# Patient Record
Sex: MALE | Race: WHITE | NOT HISPANIC OR LATINO | Employment: OTHER | URBAN - METROPOLITAN AREA
[De-identification: names, ages, dates, MRNs, and addresses within clinical notes are randomized per-mention and may not be internally consistent; named-entity substitution may affect disease eponyms.]

---

## 2021-09-25 ENCOUNTER — HOSPITAL ENCOUNTER (EMERGENCY)
Facility: MEDICAL CENTER | Age: 41
End: 2021-09-25
Attending: EMERGENCY MEDICINE | Admitting: EMERGENCY MEDICINE
Payer: COMMERCIAL

## 2021-09-25 ENCOUNTER — HOSPITAL ENCOUNTER (EMERGENCY)
Facility: MEDICAL CENTER | Age: 41
End: 2021-09-25
Payer: COMMERCIAL

## 2021-09-25 VITALS
SYSTOLIC BLOOD PRESSURE: 144 MMHG | HEART RATE: 93 BPM | HEIGHT: 74 IN | DIASTOLIC BLOOD PRESSURE: 81 MMHG | WEIGHT: 172.4 LBS | OXYGEN SATURATION: 96 % | TEMPERATURE: 98.5 F | BODY MASS INDEX: 22.13 KG/M2 | RESPIRATION RATE: 18 BRPM

## 2021-09-25 VITALS
SYSTOLIC BLOOD PRESSURE: 118 MMHG | BODY MASS INDEX: 21.25 KG/M2 | DIASTOLIC BLOOD PRESSURE: 70 MMHG | WEIGHT: 165.57 LBS | HEIGHT: 74 IN | OXYGEN SATURATION: 95 % | RESPIRATION RATE: 18 BRPM | HEART RATE: 71 BPM | TEMPERATURE: 98.4 F

## 2021-09-25 DIAGNOSIS — R05.9 COUGH: ICD-10-CM

## 2021-09-25 DIAGNOSIS — U07.1 COVID-19: ICD-10-CM

## 2021-09-25 PROCEDURE — 700111 HCHG RX REV CODE 636 W/ 250 OVERRIDE (IP): Performed by: EMERGENCY MEDICINE

## 2021-09-25 PROCEDURE — 302449 STATCHG TRIAGE ONLY (STATISTIC)

## 2021-09-25 PROCEDURE — M0243 CASIRIVI AND IMDEVI INFUSION: HCPCS

## 2021-09-25 PROCEDURE — 99284 EMERGENCY DEPT VISIT MOD MDM: CPT

## 2021-09-25 RX ADMIN — CASIRIVIMAB AND IMDEVIMAB 300 MG: 600; 600 INJECTION, SOLUTION, CONCENTRATE INTRAVENOUS at 09:59

## 2021-09-25 NOTE — ED TRIAGE NOTES
"Chief Complaint   Patient presents with   • Cough     pt c/o positive covid test last Thursday with worsening cough, fatigue and fever.        /81   Pulse 96   Temp 36.9 °C (98.5 °F) (Temporal)   Resp 18   Ht 1.88 m (6' 2\")   Wt 75.1 kg (165 lb 9.1 oz)   SpO2 98%   BMI 21.26 kg/m²     "

## 2021-09-25 NOTE — ED NOTES
ERP at bedside. Pt agrees with plan of care discussed by ERP. AIDET acknowledged with patient. Katelynn in low position, side rail up for pt safety. Call light within reach. Will continue to monitor.

## 2021-09-25 NOTE — ED PROVIDER NOTES
"ED Provider Note    Scribed for Gael Hill M.D. by Brenna Manuel. 9/25/2021  9:11 AM    Primary care provider: No primary care provider noted  Means of arrival: Walk in  History obtained from: Patient  History limited by: None    CHIEF COMPLAINT  Chief Complaint   Patient presents with    Cough     pt c/o positive covid test last Thursday with worsening cough, fatigue and fever.        HPI  Hong Eugene is a 40 y.o. male who presents to the Emergency Department for evaluation of worsening cough onset two days ago. Patient tested positive for COVID-19 ten days ago.  He admits to associated symptoms of body aches, fatigue, and fever but denies diarrhea or vomiting. No alleviating factors were reported. Patient is fully vaccinated for COVID-19.  Patient does have a history of asthma.  He has not required his inhalers.    REVIEW OF SYSTEMS  Pertinent positives include cough, fever, fatigue, and body aches.   Pertinent negatives include no vomiting or diarrhea.    All other systems reviewed and negative. See HPI for further details.       PAST MEDICAL HISTORY       SURGICAL HISTORY  patient denies any surgical history    SOCIAL HISTORY  Social History     Tobacco Use    Smoking status: None   Substance Use Topics    Alcohol use: Not noted    Drug use: Not noted      Social History     Substance and Sexual Activity   Drug Use Not noted       FAMILY HISTORY  No family history noted    CURRENT MEDICATIONS  Home Medications    **Home medications have not yet been reviewed for this encounter**         ALLERGIES  No Known Allergies    PHYSICAL EXAM  VITAL SIGNS: /81   Pulse 96   Temp 36.9 °C (98.5 °F) (Temporal)   Resp 18   Ht 1.88 m (6' 2\")   Wt 75.1 kg (165 lb 9.1 oz)   SpO2 98%   BMI 21.26 kg/m²     Nursing note and vitals reviewed.  Constitutional: Well-developed and well-nourished. No distress.   HENT: Head is normocephalic and atraumatic. Oropharynx is clear and moist without exudate or " erythema.   Eyes: Pupils are equal, round, and reactive to light. Conjunctiva are normal.   Cardiovascular: Normal rate and regular rhythm. No murmur heard. Normal radial pulses.  Pulmonary/Chest: Occasional dry cough, Scattered rhonchi, rales, normal work of breathing.  No wheezing.  Abdominal: Soft and non-tender. No distention    Musculoskeletal: Extremities exhibit normal range of motion without edema or tenderness.   Neurological: Awake, alert and oriented to person, place, and time. No focal deficits noted.  Skin: Skin is warm and dry. No rash.   Psychiatric: Normal mood and affect. Appropriate for clinical situation.    COURSE & MEDICAL DECISION MAKING  Nursing notes, VS, PMSFHx reviewed in chart.     9:11 AM - Patient seen and examined at bedside where he informed me of his history of COVID-19. Patient was diagnosed with COVID-19 ten days ago and has had a worsening cough since two days ago. Patient will be treated with Regen- mg injection. The differential diagnoses include but are not limited to: COVID-19. I discussed plan for discharge and follow up as outlined below. The patient verbalizes they feel comfortable going home. The patient is stable for discharge at this time and will return for any new or worsening symptoms. Patient verbalizes understanding and support with my plan for discharge.     Monoclonal antibody infusion EUA progress note    This patient is considered a candidate for monoclonal antibody infusion to treat/prevent high risk SARS-CoV-2 virus infection.      Criteria for use (treatment):  -Mild to moderate illness for less than 10 days and is high risk for progressing to severe COVID-19 and/or hospitalization.    -Not hospitalized.   -Does not require oxygen therapy due to COVID-19.    ->40 kg and 12 years old or greater    Criteria for use (post-exposure prophylaxis):  -High risk for progressing to severe COVID-19 and/or hospitalization  -Exposed (close contact per CDC) to  COVID-19 positive individual OR high risk of exposure to COVID-19 because of positive individual in institutional setting   -Not fully vaccinated or not expected to mount an adequate immune response due to immunocompromising condition or immunosuppressive medication  ->40 kg and 12 years old or greater    Date of positive COVID-19 test (treatment) or known exposure (post-exposure prophylaxis): 9/16/21    Vaccinated for COVID-19 (yes/no): Yes     Symptoms of COVID-19 (if being used for treatment): cough, fever, fatigue, body aches    High risk criteria:   -Age of 65 or greater  -Body mass index of 25 kg/m2or greater  -12-17 years of age and have  -BMI greater than or equal to 85% of their age and gender based CDC growth chart  -Chronic kidney disease  -Diabetes  -Pregnancy  -Immunosuppressive disease  -Receiving immunosuppressive treatment  -Chronic lung disease  -65 years of age or greater  -Cardiovascular disease  -Hypertension  -Medical related technological dependence  -Sickle cell disease  -Congenital or acquired heart disease  -Neurodevelopmental disorder  -Other medical conditions or factors that place individual patients at high risk for progression to severe COVID-19 and authorization of REGEN-COV under the EUA is not limited to the medical conditions or factors listed above. Healthcare providers should consider the benefit-risk for an individual patient.    This patient has been given the EUA patient fact sheet and consents to receiving this medication.       The patient will return for new or worsening symptoms and is stable at the time of discharge.    The patient is referred to a primary physician for blood pressure management, diabetic screening, and for all other preventative health concerns.    DISPOSITION:  Patient will be discharged home in stable condition.    FOLLOW UP:  [facility], Emergency Dept  [address]  [place] [id number] [phone]          FINAL  IMPRESSION  1. Cough    2. COVID-19          I, Brenna Manuel (Scribe), am scribing for, and in the presence of, Gael Hill M.D..    Electronically signed by: Brenna Manuel (Scribe), 9/25/2021    Gael WALKER M.D. personally performed the services described in this documentation, as scribed by Brenna Manuel in my presence, and it is both accurate and complete. C    The note accurately reflects work and decisions made by me.  Gael Hill M.D.  9/25/2021  12:46 PM

## 2022-12-07 ENCOUNTER — OFFICE VISIT (OUTPATIENT)
Dept: SURGERY | Age: 42
End: 2022-12-07
Payer: COMMERCIAL

## 2022-12-07 VITALS
WEIGHT: 244 LBS | OXYGEN SATURATION: 98 % | RESPIRATION RATE: 16 BRPM | BODY MASS INDEX: 31.32 KG/M2 | SYSTOLIC BLOOD PRESSURE: 124 MMHG | HEIGHT: 74 IN | HEART RATE: 100 BPM | TEMPERATURE: 98.1 F | DIASTOLIC BLOOD PRESSURE: 86 MMHG

## 2022-12-07 DIAGNOSIS — K21.9 GASTROESOPHAGEAL REFLUX DISEASE, UNSPECIFIED WHETHER ESOPHAGITIS PRESENT: Primary | ICD-10-CM

## 2022-12-07 PROCEDURE — 99204 OFFICE O/P NEW MOD 45 MIN: CPT | Performed by: SURGERY

## 2022-12-07 RX ORDER — METFORMIN HYDROCHLORIDE 500 MG/1
1000 TABLET ORAL 2 TIMES DAILY WITH MEALS
COMMUNITY

## 2022-12-07 RX ORDER — GLIPIZIDE 10 MG/1
10 TABLET ORAL 2 TIMES DAILY
COMMUNITY

## 2022-12-07 NOTE — PROGRESS NOTES
General Surgery Office Consultation / H & P    CC: GERD  History of Present Illness:      Abby Irby is a 38 y.o. male who presents with GERD. Patient reports over the last few years he has had worsening pain in his epigastrium. He has burning up into his chest.  This is worse with citrus food, tomato sauce, pizza, and spicy food. He is able to tolerate bland food such as bread and sugary things and salads. He reports vomiting a few times a week. The pain is severe and burning at about a 7 or so out of 10. No medication helps. He is leery of taking long-term antacids with fear of kidney issues. History of a prior laparoscopic appendectomy. No known hiatal hernia. No previous work-up for GERD to date. Past Medical History:   Diagnosis Date    Diabetes ([facility] [address]      History reviewed. No pertinent surgical history. History reviewed. No pertinent family history. Social History     Socioeconomic History    Marital status: SINGLE   Tobacco Use    Smoking status: Every Day     Packs/day: 0.50     Types: Cigarettes    Smokeless tobacco: Never   Vaping Use    Vaping Use: Never used   Substance and Sexual Activity    Alcohol use: Yes    Drug use: Never      Prior to Admission medications    Medication Sig Start Date End Date Taking? Authorizing Provider   metFORMIN (GLUCOPHAGE) 500 mg tablet Take 1,000 mg by mouth two (2) times daily (with meals). Yes Provider, Historical   glipiZIDE (GLUCOTROL) 10 mg tablet Take 10 mg by mouth two (2) times a day.    Yes Provider, Historical     No Known Allergies    Review of Systems:  Constitutional: No fever or chills  Neurologic: No headache  Eyes: No scleral icterus or irritated eyes  Nose: No nasal pain or drainage  Mouth: No oral lesions or sore throat  Cardiac: No palpations or chest pain  Pulmonary: No cough or shortness of breath  Gastrointestinal: GERD, nausea, emesis, no diarrhea, or constipation  Genitourinary: No dysuria  Musculoskeletal: No muscle or joint tenderness  Skin: No rashes or lesions  Psychiatric: No anxiety or depressed mood    Physical Exam:   Visit Vitals  /86 (BP 1 Location: Right arm, BP Patient Position: Sitting, BP Cuff Size: Large adult)   Pulse 100   Temp 98.1 °F (36.7 °C) (Oral)   Resp 16   Ht 6' 2\" (1.88 m)   Wt 244 lb (110.7 kg)   SpO2 98%   BMI 31.33 kg/m²     General: No acute distress, conversant  Eyes: PERRLA, no scleral icterus  HENT: Normocephalic without oral lesions  Neck: Trachea midline without LAD  Cardiac: Normal pulse rate and rhythm  Pulmonary: Symmetric chest rise with normal effort  GI: Soft, NT, ND, no hernia, no splenomegaly  Skin: Warm without rash  Extremities: No edema or joint stiffness  Psych: Appropriate mood and affect    Assessment:     73-year-old male with what appears to be significant GERD symptoms    Plan:   Appears to have significant GERD symptoms that did not respond to over-the-counter medications prior. Recommend EGD with Bravo clip to evaluate for hiatal hernia and acid monitoring. Also biopsy at that time. Discussed the risk and benefits of the procedure. We will meet back after this to go over results and discuss any needed future surgical intervention.     Signed By: Yadira Bueno MD  Bariatric and General Surgeon  [facility]    December 7, 2022

## 2022-12-07 NOTE — PROGRESS NOTES
Identified pt with two pt identifiers (name and ). Reviewed chart in preparation for visit and have obtained necessary documentation. Dominic Wolfe is a 38 y.o. male  Chief Complaint   Patient presents with    Epigastric Pain    Heartburn            Visit Vitals  /86 (BP 1 Location: Right arm, BP Patient Position: Sitting, BP Cuff Size: Large adult)   Pulse 100   Temp 98.1 °F (36.7 °C) (Oral)   Resp 16   Ht 6' 2\" (1.88 m)   Wt 244 lb (110.7 kg)   SpO2 98%   BMI 31.33 kg/m²       1. Have you been to the ER, urgent care clinic since your last visit? Hospitalized since your last visit? No    2. Have you seen or consulted any other health care providers outside of the [address] [place] [address] since your last visit? Include any pap smears or colon screening.  No

## 2022-12-19 ENCOUNTER — TELEPHONE (OUTPATIENT)
Dept: SURGERY | Age: 42
End: 2022-12-19

## 2022-12-19 NOTE — TELEPHONE ENCOUNTER
Returning patients call to schedule EGD. Spoke to patient to schedule 2/27/23 EGD. I let him know that this procedure is done at [address] [place] [address]. He acknowledged he  was fine with that. I offered 2/27/23  @ 7:30AM with arrival time of 6:00AM.  He acknowledged: he would take it     Message with to Bulmaro Van due to patient is requesting something to help with his acid reflux    I told him once this is scheduled I will put all this information we discussed in a letter that will go out in the mail.   He acknowledged thank you for your help

## 2022-12-20 ENCOUNTER — TELEPHONE (OUTPATIENT)
Dept: SURGERY | Age: 42
End: 2022-12-20

## 2022-12-20 NOTE — TELEPHONE ENCOUNTER
I called and spoke with the patient. I informed him the doctor will send a script for Protonix to his pharmacy. 7 days prior to the procedure you are to stop the Protonix and you can only take Tums or Mylanta. He recommended you take 1 to 2 days off for the procedure. The patient acknowledged understanding and thanked me for the call.

## 2022-12-20 NOTE — TELEPHONE ENCOUNTER
----- Message from Manda Howell MD sent at 12/20/2022  4:21 PM EST -----  Regarding: RE: EGD question  Sure, I will order him Protonix to take until a week prior to his endoscopy. Then the same rules apply and he should take only over-the-counter Tums or Mylanta for acid reflux. Please insert his pharmacy into the computers are consented to his pharmacy. Yes take the next 1 to 2 days off of work to complete the Bravo testing. Thanks      ----- Message -----  From: Alec Man LPN  Sent: 96/63/4157   1:22 PM EST  To: Manda Howell MD  Subject: Jean-Claude Calabrese: EGD question                                   ----- Message -----  From: Sarah Fuller  Sent: 12/19/2022   1:43 PM EST  To: Ezell Merlin, LPN  Subject: EGD question                                     Patient is scheduled for EGD first opening due to Lorenza Pelayo. He is wanting to know if he needs to take the next day of of work? Also wanting if there is something that can be prescribed for acid reflux. He states he had a bad episode today.   Pt# [phone]

## 2023-01-04 ENCOUNTER — DOCUMENTATION ONLY (OUTPATIENT)
Dept: SURGERY | Age: 43
End: 2023-01-04

## 2023-01-04 NOTE — PROGRESS NOTES
Checked to see if prior Filomena Certain was needed for [facility] and [phone]. Checked through [employer] for WOMN. Carin said no prior auth for [address] but referred to [facility] for [address]. When I submited through [employer] it stated it does not meet medical necessity. So Dr Anastasiya Ronquillo called to talk to a provider and was informed it didn't need prior auth. He didn't get the name of who he spoke to.

## 2023-02-13 ENCOUNTER — TELEPHONE (OUTPATIENT)
Dept: SURGERY | Age: 43
End: 2023-02-13

## 2023-02-13 NOTE — TELEPHONE ENCOUNTER
Called patient in attempt to reschedule upcoming appointment on 3/13 with Dr. Jossie Bush due to provider being at [facility]. No answer, unable to leave voicemail. Voicemail box not set up.

## 2023-02-24 ENCOUNTER — TELEPHONE (OUTPATIENT)
Dept: SURGERY | Age: 43
End: 2023-02-24

## 2023-02-24 ENCOUNTER — ANESTHESIA EVENT (OUTPATIENT)
Dept: ENDOSCOPY | Age: 43
End: 2023-02-24
Payer: COMMERCIAL

## 2023-02-24 NOTE — TELEPHONE ENCOUNTER
I called and spoke with the patient. I reminded him of his EGD scheduled for Monday. I informed him you will need to arrive at 6:00 am. Nothing to eat or drink after midnight. You can take your medications in the am with a few sips of water. If you are a diabetic you will want to hold your medication because your not able to eat anything. You will need to have a drive and bring a photo id plus proof of insurance. When you enter the main entrance of [facility] you catch the elevator to the second floor to outpatient registration. He acknowledged understanding and thanked me for the call.

## 2023-02-27 ENCOUNTER — HOSPITAL ENCOUNTER (EMERGENCY)
Age: 43
Discharge: HOME | End: 2023-02-27
Attending: EMERGENCY MEDICINE
Payer: COMMERCIAL

## 2023-02-27 ENCOUNTER — HOSPITAL ENCOUNTER (OUTPATIENT)
Age: 43
Setting detail: OUTPATIENT SURGERY
Discharge: HOME | End: 2023-02-27
Attending: SURGERY | Admitting: SURGERY
Payer: COMMERCIAL

## 2023-02-27 ENCOUNTER — ANESTHESIA (OUTPATIENT)
Dept: ENDOSCOPY | Age: 43
End: 2023-02-27
Payer: COMMERCIAL

## 2023-02-27 VITALS
RESPIRATION RATE: 16 BRPM | SYSTOLIC BLOOD PRESSURE: 143 MMHG | BODY MASS INDEX: 31.06 KG/M2 | OXYGEN SATURATION: 100 % | DIASTOLIC BLOOD PRESSURE: 102 MMHG | TEMPERATURE: 97.5 F | WEIGHT: 234.35 LBS | HEIGHT: 73 IN | HEART RATE: 94 BPM

## 2023-02-27 VITALS
HEIGHT: 73 IN | DIASTOLIC BLOOD PRESSURE: 77 MMHG | OXYGEN SATURATION: 97 % | SYSTOLIC BLOOD PRESSURE: 123 MMHG | TEMPERATURE: 98.7 F | HEART RATE: 93 BPM | BODY MASS INDEX: 30.68 KG/M2 | WEIGHT: 231.48 LBS | RESPIRATION RATE: 14 BRPM

## 2023-02-27 DIAGNOSIS — R73.9 ELEVATED BLOOD SUGAR: ICD-10-CM

## 2023-02-27 DIAGNOSIS — Z13.9 ENCOUNTER FOR MEDICAL SCREENING EXAMINATION: Primary | ICD-10-CM

## 2023-02-27 DIAGNOSIS — Z76.0 MEDICATION REFILL: ICD-10-CM

## 2023-02-27 LAB
GLUCOSE BLD STRIP.AUTO-MCNC: 296 MG/DL (ref 65–117)
GLUCOSE BLD STRIP.AUTO-MCNC: 338 MG/DL (ref 65–117)
GLUCOSE BLD STRIP.AUTO-MCNC: 372 MG/DL (ref 65–117)
GLUCOSE BLD STRIP.AUTO-MCNC: 433 MG/DL (ref 65–117)
GLUCOSE BLD STRIP.AUTO-MCNC: 437 MG/DL (ref 65–117)
SERVICE CMNT-IMP: ABNORMAL

## 2023-02-27 PROCEDURE — 2709999900 HC NON-CHARGEABLE SUPPLY: Performed by: SURGERY

## 2023-02-27 PROCEDURE — 82962 GLUCOSE BLOOD TEST: CPT

## 2023-02-27 PROCEDURE — 76040000019: Performed by: SURGERY

## 2023-02-27 PROCEDURE — 74011636637 HC RX REV CODE- 636/637: Performed by: SURGERY

## 2023-02-27 PROCEDURE — 74011250636 HC RX REV CODE- 250/636: Performed by: SURGERY

## 2023-02-27 PROCEDURE — 76060000031 HC ANESTHESIA FIRST 0.5 HR: Performed by: SURGERY

## 2023-02-27 PROCEDURE — 88305 TISSUE EXAM BY PATHOLOGIST: CPT

## 2023-02-27 PROCEDURE — 43239 EGD BIOPSY SINGLE/MULTIPLE: CPT | Performed by: SURGERY

## 2023-02-27 PROCEDURE — 99283 EMERGENCY DEPT VISIT LOW MDM: CPT

## 2023-02-27 PROCEDURE — 77030021593 HC FCPS BIOP ENDOSC BSC -A: Performed by: SURGERY

## 2023-02-27 RX ORDER — IBUPROFEN 200 MG
4 TABLET ORAL AS NEEDED
Status: DISCONTINUED | OUTPATIENT
Start: 2023-02-27 | End: 2023-02-27 | Stop reason: HOSPADM

## 2023-02-27 RX ORDER — SODIUM CHLORIDE 0.9 % (FLUSH) 0.9 %
5-40 SYRINGE (ML) INJECTION EVERY 8 HOURS
Status: DISCONTINUED | OUTPATIENT
Start: 2023-02-27 | End: 2023-02-27 | Stop reason: HOSPADM

## 2023-02-27 RX ORDER — SODIUM CHLORIDE 0.9 % (FLUSH) 0.9 %
5-40 SYRINGE (ML) INJECTION AS NEEDED
Status: DISCONTINUED | OUTPATIENT
Start: 2023-02-27 | End: 2023-02-27 | Stop reason: HOSPADM

## 2023-02-27 RX ORDER — DEXTROSE MONOHYDRATE 100 MG/ML
0-250 INJECTION, SOLUTION INTRAVENOUS AS NEEDED
Status: DISCONTINUED | OUTPATIENT
Start: 2023-02-27 | End: 2023-02-27 | Stop reason: HOSPADM

## 2023-02-27 RX ORDER — OMEPRAZOLE 40 MG/1
40 CAPSULE, DELAYED RELEASE ORAL DAILY
Qty: 90 CAPSULE | Refills: 3 | Status: SHIPPED | OUTPATIENT
Start: 2023-02-27 | End: 2023-05-28

## 2023-02-27 RX ORDER — LIDOCAINE HYDROCHLORIDE 20 MG/ML
INJECTION, SOLUTION EPIDURAL; INFILTRATION; INTRACAUDAL; PERINEURAL AS NEEDED
Status: DISCONTINUED | OUTPATIENT
Start: 2023-02-27 | End: 2023-02-27 | Stop reason: HOSPADM

## 2023-02-27 RX ORDER — SODIUM CHLORIDE 9 MG/ML
150 INJECTION, SOLUTION INTRAVENOUS ONCE
Status: COMPLETED | OUTPATIENT
Start: 2023-02-27 | End: 2023-02-27

## 2023-02-27 RX ORDER — METFORMIN HYDROCHLORIDE 500 MG/1
1000 TABLET ORAL 2 TIMES DAILY WITH MEALS
Qty: 120 TABLET | Refills: 0 | Status: SHIPPED | OUTPATIENT
Start: 2023-02-27 | End: 2023-03-29

## 2023-02-27 RX ORDER — SEMAGLUTIDE 1.34 MG/ML
0.5 INJECTION, SOLUTION SUBCUTANEOUS
COMMUNITY
End: 2023-02-27 | Stop reason: SDUPTHER

## 2023-02-27 RX ORDER — SEMAGLUTIDE 1.34 MG/ML
0.5 INJECTION, SOLUTION SUBCUTANEOUS
Qty: 1 BOX | Refills: 0 | Status: SHIPPED | OUTPATIENT
Start: 2023-02-27

## 2023-02-27 RX ORDER — PROPOFOL 10 MG/ML
INJECTION, EMULSION INTRAVENOUS
Status: DISCONTINUED | OUTPATIENT
Start: 2023-02-27 | End: 2023-02-27 | Stop reason: HOSPADM

## 2023-02-27 RX ORDER — FENTANYL CITRATE 50 UG/ML
INJECTION, SOLUTION INTRAMUSCULAR; INTRAVENOUS AS NEEDED
Status: DISCONTINUED | OUTPATIENT
Start: 2023-02-27 | End: 2023-02-27 | Stop reason: HOSPADM

## 2023-02-27 RX ORDER — PROPOFOL 10 MG/ML
INJECTION, EMULSION INTRAVENOUS AS NEEDED
Status: DISCONTINUED | OUTPATIENT
Start: 2023-02-27 | End: 2023-02-27 | Stop reason: HOSPADM

## 2023-02-27 RX ORDER — INSULIN LISPRO 100 [IU]/ML
INJECTION, SOLUTION INTRAVENOUS; SUBCUTANEOUS
Status: DISCONTINUED | OUTPATIENT
Start: 2023-02-27 | End: 2023-02-27 | Stop reason: HOSPADM

## 2023-02-27 RX ORDER — SODIUM CHLORIDE 9 MG/ML
INJECTION, SOLUTION INTRAVENOUS
Status: DISCONTINUED | OUTPATIENT
Start: 2023-02-27 | End: 2023-02-27 | Stop reason: HOSPADM

## 2023-02-27 RX ORDER — INSULIN LISPRO 100 [IU]/ML
10 INJECTION, SOLUTION INTRAVENOUS; SUBCUTANEOUS ONCE
Status: COMPLETED | OUTPATIENT
Start: 2023-02-27 | End: 2023-02-27

## 2023-02-27 RX ADMIN — FENTANYL CITRATE 50 MCG: 50 INJECTION, SOLUTION INTRAMUSCULAR; INTRAVENOUS at 07:57

## 2023-02-27 RX ADMIN — LIDOCAINE HYDROCHLORIDE 100 MG: 20 INJECTION, SOLUTION EPIDURAL; INFILTRATION; INTRACAUDAL; PERINEURAL at 08:03

## 2023-02-27 RX ADMIN — SODIUM CHLORIDE: 9 INJECTION, SOLUTION INTRAVENOUS at 07:50

## 2023-02-27 RX ADMIN — Medication 10 UNITS: at 07:30

## 2023-02-27 RX ADMIN — PROPOFOL 100 MG: 10 INJECTION, EMULSION INTRAVENOUS at 08:03

## 2023-02-27 RX ADMIN — PROPOFOL 150 MCG/KG/MIN: 10 INJECTION, EMULSION INTRAVENOUS at 08:05

## 2023-02-27 RX ADMIN — SODIUM CHLORIDE 150 ML/HR: 9 INJECTION, SOLUTION INTRAVENOUS at 08:33

## 2023-02-27 NOTE — PROGRESS NOTES
Jefe Thomas  1980  [id number]    Situation:  Verbal report received from: Bhavani Avalos Rn  Procedure: Procedure(s):  ESOPHAGOGASTRODUODENOSCOPY (EGD)  ESOPHAGOGASTRODUODENAL (EGD) BIOPSY    Background:    Preoperative diagnosis: GERD  Postoperative diagnosis:  esophagitis    :  Dr. Katarina Gallo  Assistant(s): Endoscopy Technician-1: Jayna Phillips  Endoscopy RN-1: Bryant [place] RN    Specimens:   ID Type Source Tests Collected by Time Destination   1 : gastric antrum biopsy Preservative Gastric  Eunice Green MD 2/27/2023 1925 Pathology   2 : GE junction biopsy Preservative   Eunice Green MD 2/27/2023 0518 Pathology     H. Pylori  no    Assessment:  Intra-procedure medications   Anesthesia gave intra-procedure sedation and medications, see anesthesia flow sheet yes    Intravenous fluids: NS@ KVO     Vital signs stable yes    Abdominal assessment: round and soft yes    Recommendation:  Discharge patient per MD order yes.   Family or Friend girlfriend- Romy Carlos to share finding with family or friend yes

## 2023-02-27 NOTE — ANESTHESIA PREPROCEDURE EVALUATION
Relevant Problems   No relevant active problems       Anesthetic History   No history of anesthetic complications            Review of Systems / Medical History  Patient summary reviewed and pertinent labs reviewed    Pulmonary          Smoker         Neuro/Psych   Within defined limits           Cardiovascular                  Exercise tolerance: >4 METS     GI/Hepatic/Renal     GERD: poorly controlled           Endo/Other    Diabetes: type 2         Other Findings              Physical Exam    Airway  Mallampati: III  TM Distance: 4 - 6 cm         Cardiovascular  Regular rate and rhythm,  S1 and S2 normal,  no murmur, click, rub, or gallop             Dental    Dentition: Implants     Pulmonary  Breath sounds clear to auscultation               Abdominal         Other Findings            Anesthetic Plan    ASA: 3  Anesthesia type: MAC          Induction: Intravenous  Anesthetic plan and risks discussed with: Patient

## 2023-02-27 NOTE — DISCHARGE INSTRUCTIONS
Discharge Instructions for Endoscopy Patients     Take PPI daily. Go to ER later today to get glucose regimen and get PCP asap to help manage this. Do not drive or operate machinery while taking sedating or narcotic medications. Some discomfort is expected in your throat or upper abdomen. Take tylenol as needed. If an acid monitoring device was deployed, please follow the instructions for recording and returning the device. You may walk as desired and go up and down stairs as needed. Walking is encouraged. You may shower like normal    You may resume regular diet. Follow up with provider as scheduled. If you experience fever (greater than 101.5), chills, vomiting or redness or drainage at surgical site, please contact your surgeons office. If you have further questions or concerns, please call your surgeons office at [phone].

## 2023-02-27 NOTE — PERIOP NOTES
5339  Timeout performed. Anesthesia staff at patient's bedside administering anesthesia and monitoring patients vital signs throughout procedure. See anesthesia note. Post procedure, report received from Iris TERRY  Endoscope was pre-cleaned at bedside immediately following procedure by endo tech,    [address]  Patient tolerated procedure. Abdomen soft and patient arousable and voices no complaints. Patient transported to endoscopy recovery area.    Report given to post procedure RNModesto

## 2023-02-27 NOTE — DISCHARGE INSTRUCTIONS
Local Primary Care Physicians  [facility] [phone]  MD Dale Abarca MD Madaline Manna, MD [facility] [phone]  Hemant Yoon, [facility]  MD Jake Burns MD Ned Quin, MD [address]  [phone]  Carl Maxwell, MD Lex Bangura MD [address] [facility] [phone]  MD Elsy Colorado, MD Seema Nur, MD Brandyn Daniels MD   [facility] [phone]  VNRQ CHRISTINA GENAO, MD Corinne Child, MD Mark Carroll, NP [address] [facility] [address] [phone]  MD Sania Gong, MD Yue Joy, MD Seun Astudillo, MD Katie Kam, MD Shona Coto MD   [address] [facility] MD [facility] [phone]  Sulaiman Sanchez, MD Marquita Baker, NP  Aly Seo, MD Maxim Fajardo, MD Gelene Aase, MD Martha Blancas, MD Gerald Domínguez MD   [address] [facility] [phone]  MD Mirna Dee, [facility]  Wolf Sarabia, NP  Germain Swan, MD Travis Kauffman, MD Amrik Coronel MD [facility] [phone]  MD Wilber Mantilla, MD Armand Noland, MD Shantanu Adams MD   [address] [phone]  Tobias Keith, MD Shilpa Esparza [phone]  MD Dee Lugo MD Kathern Rosette, MD   [facility] [phone]  MD Steve Fields, MD Yudith Brown, MD Mortimer Gammons, MD Marvis Rundle, NP  Debbie Corrigan MD [address]   [phone]  MD Van Escobar, MD Rey Boland MD   [address] [facility] [phone]  Arelis Gonzalez MD  Clio Section, P  Abdiel Reynolds, ALEENA Reynolds, [facility]  Sha Hemphill, MD Samina Timmons NP Fleeta Groom,              Miscellaneous:  Denise Wiggins MD [facility] Departments     For adult and child immunizations, family planning, TB screening, STD testing and women's health services. [facility]: Ariel [phone]      [place] [address]   [address]   [address]   170 [facility]: Catalina Noriega 200 [facility] [phone]      [address] [facility]          [address] [address]     For primary care services, woman and child wellness, and some clinics providing specialty care. [facility] -- [address] 2525 [facility] [phone]   [facility] 200 [facility] 3614 [facility] [phone]   [address] [facility] [address] [phone] [address] [facility] [address] [place] [address] 5850  Community  [phone]   [address] [place] [address] [address] [phone]   [facility] 81 [facility] [phone]   [address] [phone]   [facility]: [facility] [address], [address] [facility] [address] [phone]     [address] [phone]   [facility] 5850  Community  [phone]   [employer]  [address] ([email or web address]) [phone]         Sexual Health/Woman Wellness Clinics    For STD/HIV testing and treatment, pregnancy testing and services, men's health, birth control services, LGBT services, and hepatitis/HPV vaccine services. [facility] [facility] [place] for [place] [employer] [address] [facility] 75 [facility] 1579 600 EDayami Mello [phone]   [address] [place] [address] Rd, 5th floor [phone]   [facility] 59 [facility] 118 NDayami Vance [phone]         [address] [facility] [address] [place] [address]   [phone]   [place]   [phone]   Women, Infant and Children's Services: [address] [phone]       [address] [place] [address]   [phone]   [address]   0762 [facility]     [phone]   [address] Crisis   1212 [facility] [phone]

## 2023-02-27 NOTE — PROGRESS NOTES
Endoscopy discharge instructions have been reviewed and given to patient. The patient verbalized understanding and acceptance of instructions. Dr. Livan Tai discussed with  procedure findings and next steps.

## 2023-02-27 NOTE — ED PROVIDER NOTES
[facility] EMERGENCY DEPT  EMERGENCY DEPARTMENT ENCOUNTER       Pt Name: Toño Pritchett  MRN: [id number]  [place] 1980  Date of evaluation: 2/27/2023  Provider: Gene Shah MD   PCP: None  Note Started: 10:23 AM 2/27/23     CHIEF COMPLAINT       Chief Complaint   Patient presents with    Abnormal Lab Results     Pt had an endoscopy this morning. They took his BGL and noted it to be 477. He was given 10 units of insulin at that time. Post surgery he was instructed to come to the ER to make sure his sugars were back to normal        HISTORY OF PRESENT ILLNESS: 1 or more elements      History From: patient, History limited by: none     Toño Pritchett is a 38 y.o. male presents as a referral from outpatient endoscopy. 38 YOM with a history of diabetes and GERD presents to the ED as a referral from outpatient endoscopy at [facility] with abnormal labs. Reports has been n.p.o. since last evening. Has not taken his metformin. He is out of his Ozempic. Prior to the procedure his glucose was 477. He does report some polydipsia but no other symptoms. Denies chest pain, fevers or polyuria. Reports that was given fluids and insulin there. Blood glucose improving but advised to follow-up in the ED due to elevated blood sugars. Patient evaluated triage, no complaints currently. Blood glucose improving here. Please See MDM for Additional Details of the HPI/PMH  Nursing Notes were all reviewed and agreed with or any disagreements were addressed in the HPI. REVIEW OF SYSTEMS        Positives and Pertinent negatives as per HPI.     PAST HISTORY     Past Medical History:  Past Medical History:   Diagnosis Date    Diabetes ([address]     GERD (gastroesophageal reflux disease)        Past Surgical History:  Past Surgical History:   Procedure Laterality Date    HX APPENDECTOMY      HX CHOLECYSTECTOMY      HX HEENT      wisdom teeth extracted - x 3    HX OTHER SURGICAL      had a surgery age 1 yrs - does not recall what it was Family History:  Family History   Problem Relation Age of Onset    Diabetes Father     Diabetes Other         multiple paternal relatives       Social History:  Social History     Tobacco Use    Smoking status: Every Day     Packs/day: 0.50     Types: Cigarettes    Smokeless tobacco: Never   Vaping Use    Vaping Use: Never used   Substance Use Topics    Alcohol use: Not Currently    Drug use: Never       Allergies:  No Known Allergies    CURRENT MEDICATIONS      Discharge Medication List as of 2/27/2023 10:41 AM        CONTINUE these medications which have NOT CHANGED    Details   omeprazole (PRILOSEC) 40 mg capsule Take 1 Capsule by mouth daily for 90 days. , Normal, Disp-90 Capsule, R-3             SCREENINGS               No data recorded         PHYSICAL EXAM      ED Triage Vitals [02/27/23 1003]   ED Encounter Vitals Group      BP (!) 143/102      Pulse (Heart Rate) 94      Resp Rate 16      Temp 97.5 °F (36.4 °C)      Temp src       O2 Sat (%) 100 %      Weight 234 lb 5.6 oz      Height 6' 1\"        Physical Exam  Vitals and nursing note reviewed. Constitutional:       Comments: 38 YOM, sitting in chair, no distress   HENT:      Head: Normocephalic. Pulmonary:      Effort: Pulmonary effort is normal.   Abdominal:      General: Abdomen is flat. Musculoskeletal:         General: Normal range of motion. Skin:     General: Skin is warm. Neurological:      General: No focal deficit present. Mental Status: He is alert.    Psychiatric:         Mood and Affect: Mood normal.        DIAGNOSTIC RESULTS   LABS:     Recent Results (from the past 12 hour(s))   GLUCOSE, POC    Collection Time: 02/27/23  6:52 AM   Result Value Ref Range    Glucose (POC) 433 (H) 65 - 117 mg/dL    Performed by [employer], POC    Collection Time: 02/27/23  6:55 AM   Result Value Ref Range    Glucose (POC) 437 (H) 65 - 117 mg/dL    Performed by [employer], POC    Collection Time: 02/27/23  8:24 AM Result Value Ref Range    Glucose (POC) 372 (H) 65 - 117 mg/dL    Performed by Gaby Posey, POC    Collection Time: 02/27/23  8:53 AM   Result Value Ref Range    Glucose (POC) 338 (H) 65 - 117 mg/dL    Performed by Gaby Posey, POC    Collection Time: 02/27/23 10:09 AM   Result Value Ref Range    Glucose (POC) 296 (H) 65 - 117 mg/dL    Performed by [employer]         EKG: If performed, independent interpretation documented below in the MDM section     RADIOLOGY:  Non-plain film images such as CT, Ultrasound and MRI are read by the radiologist. Plain radiographic images are visualized and preliminarily interpreted by the ED Provider with the findings documented in the MDM section. Interpretation per the Radiologist below, if available at the time of this note:     No results found. PROCEDURES   Unless otherwise noted below, none  Procedures     CRITICAL CARE TIME   0    EMERGENCY DEPARTMENT COURSE and DIFFERENTIAL DIAGNOSIS/MDM   Vitals:    Vitals:    02/27/23 1003   BP: (!) 143/102   Pulse: 94   Resp: 16   Temp: 97.5 °F (36.4 °C)   SpO2: 100%   Weight: 106.3 kg (234 lb 5.6 oz)   Height: 6' 1\" (1.854 m)        Patient was given the following medications:  Medications - No data to display    Medical Decision Making  40-year-old male presents emergency department with a chief complaint of hyperglycemia. Vitals are unremarkable. Patient appears well. No complaints other than mild dry mouth. No evidence of HHNK or DKA. Appears euvolemic. Elevated glucose likely in setting of not having his home medications; patient has not had his ozempic, he did not take metformin this AM.  It is improving after insulin and fluids at endoscopy. I will refill his medications and discharge with PCP referrals. Patient does not have a PCP as he is new to this area. Patient comfortable with plan and agreeable. Amount and/or Complexity of Data Reviewed  Labs: ordered.  Decision-making details documented in ED Course. Risk  Prescription drug management. Diagnosis or treatment significantly limited by social determinants of health. FINAL IMPRESSION     1. Encounter for medical screening examination    2. Elevated blood sugar    3. Medication refill          DISPOSITION/PLAN   Evelyn Garcia  results have been reviewed with him. He has been counseled regarding his diagnosis, treatment, and plan. He verbally conveys understanding and agreement of the signs, symptoms, diagnosis, treatment and prognosis and additionally agrees to follow up as discussed. He also agrees with the care-plan and conveys that all of his questions have been answered. I have also provided discharge instructions for him that include: educational information regarding their diagnosis and treatment, and list of reasons why they would want to return to the ED prior to their follow-up appointment, should his condition change. CLINICAL IMPRESSION    Discharged    PATIENT REFERRED TO:  Follow-up Information       Follow up With Specialties Details Why Contact Info    [facility] EMERGENCY DEPT Emergency Medicine  If symptoms worsen [address] [facility]  In 1 week  [address]  [phone]              DISCHARGE MEDICATIONS:  Discharge Medication List as of 2/27/2023 10:41 AM        CONTINUE these medications which have CHANGED    Details   metFORMIN (GLUCOPHAGE) 500 mg tablet Take 2 Tablets by mouth two (2) times daily (with meals) for 30 days. , Normal, Disp-120 Tablet, R-0      semaglutide (Ozempic) 0.25 mg or 0.5 mg/dose (2 mg/1.5 ml) subq pen 0.5 mg by SubCUTAneous route every seven (7) days. , Normal, Disp-1 Box, R-0           CONTINUE these medications which have NOT CHANGED    Details   omeprazole (PRILOSEC) 40 mg capsule Take 1 Capsule by mouth daily for 90 days. , Normal, Disp-90 Capsule, R-3 DISCONTINUED MEDICATIONS:  Discharge Medication List as of 2/27/2023 10:41 AM          I am the Primary Clinician of Record. Blossom Burns MD (electronically signed)    (Please note that parts of this dictation were completed with voice recognition software. Quite often unanticipated grammatical, syntax, homophones, and other interpretive errors are inadvertently transcribed by the computer software. Please disregards these errors.  Please excuse any errors that have escaped final proofreading.)

## 2023-02-27 NOTE — OP NOTES
[facility]   Endoscopic Procedure Note        NAME:  Jv Galaviz   :   1980   MRN:   [id number]     Date/Time:  2023 8:20 AM    Esophagogastroduodenoscopy (EGD) Procedure Note    Preoperative Diagnosis: GERD  Postoperative Diagnosis:  esophagitis      Surgeon:  Rafy Jacobo MD    Staff: Endoscopy Technician-1: Humza Lima  Endoscopy RN-1: Ronald Brenner RN     Implants: None    Referring Provider:  None    Anethesia/Sedation:  MAC anesthesia Propofol    Procedure Details     After infom consent was obtained for the procedure, with all risks and benefits of procedure explained the patient was taken to the endoscopy suite and placed in the left lateral decubitus position. Following sequential administration of sedation as per above, the GIF-H190 gastroscope was inserted into the mouth and advanced under direct vision to duodenal bulb. A careful inspection was made as the gastroscope was withdrawn, including a retroflexed view of the proximal stomach; findings and interventions are described below. Findings:  Esophagus: LA grade B esophagitis,  GEJ at 38 cm, no hiatal hernia  Stomach: Mild gastritis, Hill Grade 2 valve  Duodenum/jejunum: Normal       Therapies: Bx    Specimens: bx antrum for H pylori and bx distal esophagus for esophagitis           EBL: Minimal    Complications:   None; patient tolerated the procedure well. Impression:    See Postoperative diagnosis above    Recommendations:  PPI daily  Go to ER near home later today to make sure glucose is controlled.     Discharge disposition:  Home in the company of  when able to ambulate    Rafy Jacobo MD  Bariatric and General Surgeon  [facility]

## 2023-02-27 NOTE — H&P
General Surgery History and Physical    CC: GERD    History of Present Illness:      Afua Ba is a 38 y.o. male who presents with GERD refractory to medical management here for EGD. Past Medical History:   Diagnosis Date    Diabetes ([address]     GERD (gastroesophageal reflux disease)      Past Surgical History:   Procedure Laterality Date    HX APPENDECTOMY      HX CHOLECYSTECTOMY      HX HEENT      wisdom teeth extracted - x 3    HX OTHER SURGICAL      had a surgery age 1 yrs - does not recall what it was      Family History   Problem Relation Age of Onset    Diabetes Father     Diabetes Other         multiple paternal relatives     Social History     Socioeconomic History    Marital status: SINGLE   Tobacco Use    Smoking status: Every Day     Packs/day: 0.50     Types: Cigarettes    Smokeless tobacco: Never   Vaping Use    Vaping Use: Never used   Substance and Sexual Activity    Alcohol use: Not Currently    Drug use: Never      Prior to Admission medications    Medication Sig Start Date End Date Taking? Authorizing Provider   semaglutide (Ozempic) 0.25 mg or 0.5 mg/dose (2 mg/1.5 ml) subq pen 0.5 mg by SubCUTAneous route every seven (7) days. Yes Provider, Historical   metFORMIN (GLUCOPHAGE) 500 mg tablet Take 1,000 mg by mouth two (2) times daily (with meals). Yes Provider, Historical     No Known Allergies    Review of Systems:  Constitutional: No fever or chills  Neurologic: No headache  Eyes: No scleral icterus or irritated eyes  Nose: No nasal pain or drainage  Mouth: No oral lesions or sore throat  Cardiac: No palpations or chest pain  Pulmonary: No cough or shortness of breath  Gastrointestinal: GERD, No nausea, emesis, diarrhea, or constipation  Genitourinary: No dysuria  Musculoskeletal: No muscle or joint tenderness  Skin: No rashes or lesions  Psychiatric: No anxiety or depressed mood    Physical Exam:   No data recorded. There were no vitals taken for this visit.   General: No acute distress, conversant  Eyes: PERRLA, no scleral icterus  HENT: Normocephalic without oral lesions  Neck: Trachea midline without LAD  Cardiac: Normal pulse rate and rhythm  Pulmonary: Symmetric chest rise with normal effort  GI: Soft, NT, ND, no hernia, no splenomegaly  Skin: Warm without rash  Extremities: No edema or joint stiffness  Psych: Appropriate mood and affect    Assessment:     37 y/o M with GERD here for EGD and possible bravo clip    Plan:     NPO  Consent obtained  Bravo teaching in case      Signed By: Dara Velez MD  Bariatric and General Surgeon  [facility]    February 27, 2023

## 2023-02-27 NOTE — PERIOP NOTES
0725  FSBS 437 relayed to Dr Lea Gaffney who ordered 10U lispro insuline. 7216. Insulin given SQ as ordered R arm.   Procedure start delayed 30 min

## 2023-03-21 ENCOUNTER — OFFICE VISIT (OUTPATIENT)
Dept: SURGERY | Age: 43
End: 2023-03-21
Payer: COMMERCIAL

## 2023-03-21 VITALS
WEIGHT: 232 LBS | HEIGHT: 73 IN | BODY MASS INDEX: 30.75 KG/M2 | RESPIRATION RATE: 18 BRPM | DIASTOLIC BLOOD PRESSURE: 68 MMHG | HEART RATE: 96 BPM | TEMPERATURE: 98.1 F | OXYGEN SATURATION: 97 % | SYSTOLIC BLOOD PRESSURE: 106 MMHG

## 2023-03-21 DIAGNOSIS — K21.00 GASTROESOPHAGEAL REFLUX DISEASE WITH ESOPHAGITIS WITHOUT HEMORRHAGE: Primary | ICD-10-CM

## 2023-03-21 PROCEDURE — 99213 OFFICE O/P EST LOW 20 MIN: CPT | Performed by: SURGERY

## 2023-03-21 NOTE — PROGRESS NOTES
Surgery Progress Note    3/21/2023    CC: GERD    Subjective:     Since his last EGD patient has felt better taking his PPI. He has learned to avoid alcohol which has improved his symptoms. Avoiding citrusy foods. No further symptoms of GERD. He is taking his Ozempic and his metformin. Trying to get into a PCP soon. He is looking around. Constitutional: No fever or chills  Neurologic: No headache  Eyes: No scleral icterus or irritated eyes  Nose: No nasal pain or drainage  Mouth: No oral lesions or sore throat  Cardiac: No palpations or chest pain  Pulmonary: No cough or shortness of breath  Gastrointestinal: No nausea, emesis, diarrhea, or constipation  Genitourinary: No dysuria  Musculoskeletal: No muscle or joint tenderness  Skin: No rashes or lesions  Psychiatric: No anxiety or depressed mood    Objective:   Visit Vitals  /68   Pulse 96   Temp 98.1 °F (36.7 °C)   Resp 18   Ht 6' 1\" (1.854 m)   Wt 232 lb (105.2 kg)   SpO2 97%   BMI 30.61 kg/m²       General: No acute distress, conversant  Eyes: PERRLA, no scleral icterus  HENT: Normocephalic without oral lesions  Neck: Trachea midline without LAD  Cardiac: Normal pulse rate and rhythm  Pulmonary: Symmetric chest rise with normal effort  GI: Soft, NT, ND, no hernia, no splenomegaly  Skin: Warm without rash  Extremities: No edema or joint stiffness  Psych: Appropriate mood and affect    Assessment:     43-year-old male with EGD showing esophagitis responsive to PPI therapy  Plan:     Recommend PPI daily indefinitely. Follow-up with PCP regarding diabetes control and long-term PPI refills. Can follow-up with me as needed.       Dara Velez MD  Bariatric and General Surgeon  [facility]

## 2023-03-21 NOTE — PROGRESS NOTES
Identified pt with two pt identifiers (name and ). Reviewed chart in preparation for visit and have obtained necessary documentation. Stephanie Walker is a 38 y.o. male  Chief Complaint   Patient presents with    GERD    Follow-up     S/P EGD 23,  Esophagitis     Visit Vitals  /68   Pulse 96   Temp 98.1 °F (36.7 °C)   Resp 18   Ht 6' 1\" (1.854 m)   Wt 232 lb (105.2 kg)   SpO2 97%   BMI 30.61 kg/m²       1. Have you been to the ER, urgent care clinic since your last visit? Hospitalized since your last visit? No    2. Have you seen or consulted any other health care providers outside of the [address] [place] [address] since your last visit? Include any pap smears or colon screening.  No

## 2023-10-02 ENCOUNTER — TELEPHONE (OUTPATIENT)
Age: 43
End: 2023-10-02

## 2023-10-02 NOTE — TELEPHONE ENCOUNTER
Called to schedule consult from faxed referral 10. 2.23    NP lipoma on back, daquan martin, notes in drawer 10. 2.23

## 2023-10-13 ENCOUNTER — TELEPHONE (OUTPATIENT)
Age: 43
End: 2023-10-13

## 2023-10-13 NOTE — TELEPHONE ENCOUNTER
Spoke with patient and he has not had any imaging done. I have called Lorne Brody NP who referred him to us to request records. I spoke with Kathrin Marin and she will be sending over the office note.

## 2023-10-18 ENCOUNTER — TELEPHONE (OUTPATIENT)
Age: 43
End: 2023-10-18

## 2023-10-19 ENCOUNTER — OFFICE VISIT (OUTPATIENT)
Age: 43
End: 2023-10-19
Payer: COMMERCIAL

## 2023-10-19 VITALS
HEART RATE: 96 BPM | BODY MASS INDEX: 30.56 KG/M2 | RESPIRATION RATE: 20 BRPM | HEIGHT: 73 IN | WEIGHT: 230.6 LBS | SYSTOLIC BLOOD PRESSURE: 104 MMHG | TEMPERATURE: 98.1 F | OXYGEN SATURATION: 95 % | DIASTOLIC BLOOD PRESSURE: 62 MMHG

## 2023-10-19 DIAGNOSIS — M79.89 SOFT TISSUE MASS: Primary | ICD-10-CM

## 2023-10-19 PROCEDURE — 99204 OFFICE O/P NEW MOD 45 MIN: CPT | Performed by: SURGERY

## 2023-10-19 RX ORDER — ERGOCALCIFEROL 1.25 MG/1
50000 CAPSULE ORAL
COMMUNITY
Start: 2023-08-29

## 2023-10-19 RX ORDER — GLIPIZIDE 5 MG/1
5 TABLET ORAL DAILY
COMMUNITY
Start: 2023-10-05

## 2023-10-19 ASSESSMENT — PATIENT HEALTH QUESTIONNAIRE - PHQ9
SUM OF ALL RESPONSES TO PHQ QUESTIONS 1-9: 0
SUM OF ALL RESPONSES TO PHQ QUESTIONS 1-9: 0
2. FEELING DOWN, DEPRESSED OR HOPELESS: 0
SUM OF ALL RESPONSES TO PHQ QUESTIONS 1-9: 0
SUM OF ALL RESPONSES TO PHQ QUESTIONS 1-9: 0
SUM OF ALL RESPONSES TO PHQ9 QUESTIONS 1 & 2: 0
1. LITTLE INTEREST OR PLEASURE IN DOING THINGS: 0

## 2023-10-19 ASSESSMENT — ENCOUNTER SYMPTOMS
SHORTNESS OF BREATH: 0
EYE PAIN: 0
BLOOD IN STOOL: 0

## 2023-10-19 NOTE — H&P (VIEW-ONLY)
Omer Rucker (:  1980) is a 37 y.o. male, here for evaluation of the following chief complaint(s): Mass (Seen at the request of Kathy Vázquez NP for evaluation of possible lipoma to back)         ASSESSMENT/PLAN:  1. Soft tissue mass    Likely lipoma. I had an extensive discussion with Omer Rucker regarding the risks, benefits, and alternatives of proceeding with excision of this soft tissue mass. Risks of surgery including the risk of anesthesia, bleeding, infection, injury to underlying structures, recurrence, need for further surgery, and the lack of symptomatic improvement were discussed. Further management after final pathology. Based on the size and location of the mass, this will be best done under sedation. We discussed expected recovery and likely straightforward incision care. He expressed understanding of our discussion and is in agreement to proceed. Thank you for this consult. Subjective   HPI:  Mass  Pertinent negatives include no arthralgias, chest pain, chills, fever, headaches, rash or weakness. Getting bigger  Tender to touch  No drainage      Review of Systems   Constitutional:  Negative for chills, fever and unexpected weight change. HENT:  Negative for ear pain. Eyes:  Negative for pain. Respiratory:  Negative for shortness of breath. Cardiovascular:  Negative for chest pain. Gastrointestinal:  Negative for blood in stool. Genitourinary:  Negative for hematuria. Musculoskeletal:  Negative for arthralgias. Skin:  Negative for rash. Neurological:  Negative for dizziness, seizures, weakness and headaches. Hematological:  Does not bruise/bleed easily. Psychiatric/Behavioral:  Negative for confusion and sleep disturbance. Objective   Physical Exam  Constitutional:       General: He is not in acute distress. Appearance: He is well-developed. He is not diaphoretic. HENT:      Head: Normocephalic and atraumatic. Mouth/Throat:      Pharynx: No oropharyngeal exudate. Eyes:      General: No scleral icterus. Pupils: Pupils are equal, round, and reactive to light. Neck:      Trachea: No tracheal deviation. Cardiovascular:      Rate and Rhythm: Normal rate and regular rhythm. Heart sounds: Normal heart sounds. No murmur heard. Pulmonary:      Effort: Pulmonary effort is normal. No respiratory distress. Breath sounds: No wheezing. Abdominal:      General: Bowel sounds are normal. There is no distension. Palpations: Abdomen is soft. There is no mass. Tenderness: There is no abdominal tenderness. There is no guarding or rebound. Hernia: No hernia is present. Musculoskeletal:         General: No tenderness. Normal range of motion. Cervical back: Normal range of motion and neck supple. Lymphadenopathy:      Cervical: No cervical adenopathy. Skin:     General: Skin is warm. Findings: No erythema or rash. Neurological:      Mental Status: He is alert and oriented to person, place, and time.    Psychiatric:         Behavior: Behavior normal.                      --Leopoldo Coombs MD

## 2023-10-19 NOTE — PROGRESS NOTES
Omer Rucker (:  1980) is a 37 y.o. male, here for evaluation of the following chief complaint(s): Mass (Seen at the request of Kathy Vázquez NP for evaluation of possible lipoma to back)         ASSESSMENT/PLAN:  1. Soft tissue mass    Likely lipoma. I had an extensive discussion with Omer Rucker regarding the risks, benefits, and alternatives of proceeding with excision of this soft tissue mass. Risks of surgery including the risk of anesthesia, bleeding, infection, injury to underlying structures, recurrence, need for further surgery, and the lack of symptomatic improvement were discussed. Further management after final pathology. Based on the size and location of the mass, this will be best done under sedation. We discussed expected recovery and likely straightforward incision care. He expressed understanding of our discussion and is in agreement to proceed. Thank you for this consult. Subjective   HPI:  Mass  Pertinent negatives include no arthralgias, chest pain, chills, fever, headaches, rash or weakness. Getting bigger  Tender to touch  No drainage      Review of Systems   Constitutional:  Negative for chills, fever and unexpected weight change. HENT:  Negative for ear pain. Eyes:  Negative for pain. Respiratory:  Negative for shortness of breath. Cardiovascular:  Negative for chest pain. Gastrointestinal:  Negative for blood in stool. Genitourinary:  Negative for hematuria. Musculoskeletal:  Negative for arthralgias. Skin:  Negative for rash. Neurological:  Negative for dizziness, seizures, weakness and headaches. Hematological:  Does not bruise/bleed easily. Psychiatric/Behavioral:  Negative for confusion and sleep disturbance. Objective   Physical Exam  Constitutional:       General: He is not in acute distress. Appearance: He is well-developed. He is not diaphoretic. HENT:      Head: Normocephalic and atraumatic.

## 2023-10-25 ENCOUNTER — TELEPHONE (OUTPATIENT)
Age: 43
End: 2023-10-25

## 2023-11-09 ENCOUNTER — ANESTHESIA EVENT (OUTPATIENT)
Facility: HOSPITAL | Age: 43
End: 2023-11-09
Payer: MEDICAID

## 2023-11-10 ENCOUNTER — ANESTHESIA (OUTPATIENT)
Facility: HOSPITAL | Age: 43
End: 2023-11-10
Payer: MEDICAID

## 2023-11-10 ENCOUNTER — HOSPITAL ENCOUNTER (OUTPATIENT)
Facility: HOSPITAL | Age: 43
Setting detail: OUTPATIENT SURGERY
Discharge: HOME | End: 2023-11-10
Attending: SURGERY | Admitting: SURGERY
Payer: MEDICAID

## 2023-11-10 VITALS
OXYGEN SATURATION: 97 % | TEMPERATURE: 98.2 F | HEART RATE: 94 BPM | BODY MASS INDEX: 30.62 KG/M2 | HEIGHT: 73 IN | WEIGHT: 231 LBS | DIASTOLIC BLOOD PRESSURE: 78 MMHG | RESPIRATION RATE: 15 BRPM | SYSTOLIC BLOOD PRESSURE: 115 MMHG

## 2023-11-10 DIAGNOSIS — R52 PAIN: Primary | ICD-10-CM

## 2023-11-10 LAB
GLUCOSE BLD STRIP.AUTO-MCNC: 302 MG/DL (ref 65–117)
GLUCOSE BLD STRIP.AUTO-MCNC: 344 MG/DL (ref 65–117)
SERVICE CMNT-IMP: ABNORMAL
SERVICE CMNT-IMP: ABNORMAL

## 2023-11-10 PROCEDURE — 3600000012 HC SURGERY LEVEL 2 ADDTL 15MIN: Performed by: SURGERY

## 2023-11-10 PROCEDURE — 6360000002 HC RX W HCPCS: Performed by: SURGERY

## 2023-11-10 PROCEDURE — 6360000002 HC RX W HCPCS: Performed by: NURSE ANESTHETIST, CERTIFIED REGISTERED

## 2023-11-10 PROCEDURE — 2580000003 HC RX 258: Performed by: ANESTHESIOLOGY

## 2023-11-10 PROCEDURE — 2709999900 HC NON-CHARGEABLE SUPPLY: Performed by: SURGERY

## 2023-11-10 PROCEDURE — 88305 TISSUE EXAM BY PATHOLOGIST: CPT

## 2023-11-10 PROCEDURE — 3600000002 HC SURGERY LEVEL 2 BASE: Performed by: SURGERY

## 2023-11-10 PROCEDURE — 3700000001 HC ADD 15 MINUTES (ANESTHESIA): Performed by: SURGERY

## 2023-11-10 PROCEDURE — 2500000003 HC RX 250 WO HCPCS: Performed by: NURSE ANESTHETIST, CERTIFIED REGISTERED

## 2023-11-10 PROCEDURE — 7100000010 HC PHASE II RECOVERY - FIRST 15 MIN: Performed by: SURGERY

## 2023-11-10 PROCEDURE — 7100000001 HC PACU RECOVERY - ADDTL 15 MIN: Performed by: SURGERY

## 2023-11-10 PROCEDURE — 2500000003 HC RX 250 WO HCPCS: Performed by: SURGERY

## 2023-11-10 PROCEDURE — 7100000000 HC PACU RECOVERY - FIRST 15 MIN: Performed by: SURGERY

## 2023-11-10 PROCEDURE — 2580000003 HC RX 258: Performed by: SURGERY

## 2023-11-10 PROCEDURE — 82962 GLUCOSE BLOOD TEST: CPT

## 2023-11-10 PROCEDURE — 3700000000 HC ANESTHESIA ATTENDED CARE: Performed by: SURGERY

## 2023-11-10 RX ORDER — SULFAMETHOXAZOLE AND TRIMETHOPRIM 800; 160 MG/1; MG/1
1 TABLET ORAL 2 TIMES DAILY
Qty: 14 TABLET | Refills: 0 | Status: SHIPPED | OUTPATIENT
Start: 2023-11-10 | End: 2023-11-17

## 2023-11-10 RX ORDER — KETOROLAC TROMETHAMINE 30 MG/ML
30 INJECTION, SOLUTION INTRAMUSCULAR; INTRAVENOUS
Status: DISCONTINUED | OUTPATIENT
Start: 2023-11-10 | End: 2023-11-10 | Stop reason: HOSPADM

## 2023-11-10 RX ORDER — PROPOFOL 10 MG/ML
INJECTION, EMULSION INTRAVENOUS PRN
Status: DISCONTINUED | OUTPATIENT
Start: 2023-11-10 | End: 2023-11-10 | Stop reason: SDUPTHER

## 2023-11-10 RX ORDER — OXYCODONE HYDROCHLORIDE 5 MG/1
5 TABLET ORAL PRN
Status: DISCONTINUED | OUTPATIENT
Start: 2023-11-10 | End: 2023-11-10 | Stop reason: HOSPADM

## 2023-11-10 RX ORDER — PHENYLEPHRINE HCL IN 0.9% NACL 0.4MG/10ML
SYRINGE (ML) INTRAVENOUS PRN
Status: DISCONTINUED | OUTPATIENT
Start: 2023-11-10 | End: 2023-11-10 | Stop reason: SDUPTHER

## 2023-11-10 RX ORDER — MIDAZOLAM HYDROCHLORIDE 1 MG/ML
INJECTION INTRAMUSCULAR; INTRAVENOUS PRN
Status: DISCONTINUED | OUTPATIENT
Start: 2023-11-10 | End: 2023-11-10 | Stop reason: SDUPTHER

## 2023-11-10 RX ORDER — DEXAMETHASONE SODIUM PHOSPHATE 4 MG/ML
INJECTION, SOLUTION INTRA-ARTICULAR; INTRALESIONAL; INTRAMUSCULAR; INTRAVENOUS; SOFT TISSUE PRN
Status: DISCONTINUED | OUTPATIENT
Start: 2023-11-10 | End: 2023-11-10 | Stop reason: SDUPTHER

## 2023-11-10 RX ORDER — LIDOCAINE HYDROCHLORIDE 10 MG/ML
1 INJECTION, SOLUTION EPIDURAL; INFILTRATION; INTRACAUDAL; PERINEURAL
Status: DISCONTINUED | OUTPATIENT
Start: 2023-11-10 | End: 2023-11-10 | Stop reason: HOSPADM

## 2023-11-10 RX ORDER — SODIUM CHLORIDE 0.9 % (FLUSH) 0.9 %
5-40 SYRINGE (ML) INJECTION PRN
Status: DISCONTINUED | OUTPATIENT
Start: 2023-11-10 | End: 2023-11-10 | Stop reason: HOSPADM

## 2023-11-10 RX ORDER — SODIUM CHLORIDE 9 MG/ML
INJECTION, SOLUTION INTRAVENOUS PRN
Status: DISCONTINUED | OUTPATIENT
Start: 2023-11-10 | End: 2023-11-10 | Stop reason: HOSPADM

## 2023-11-10 RX ORDER — ONDANSETRON 4 MG/1
4 TABLET, FILM COATED ORAL EVERY 8 HOURS PRN
Qty: 8 TABLET | Refills: 1 | Status: SHIPPED | OUTPATIENT
Start: 2023-11-10

## 2023-11-10 RX ORDER — ACETAMINOPHEN 325 MG/1
650 TABLET ORAL 4 TIMES DAILY PRN
COMMUNITY
Start: 2023-11-10

## 2023-11-10 RX ORDER — ONDANSETRON 2 MG/ML
4 INJECTION INTRAMUSCULAR; INTRAVENOUS
Status: DISCONTINUED | OUTPATIENT
Start: 2023-11-10 | End: 2023-11-10 | Stop reason: HOSPADM

## 2023-11-10 RX ORDER — BUPIVACAINE HYDROCHLORIDE 5 MG/ML
INJECTION, SOLUTION EPIDURAL; INTRACAUDAL PRN
Status: DISCONTINUED | OUTPATIENT
Start: 2023-11-10 | End: 2023-11-10 | Stop reason: ALTCHOICE

## 2023-11-10 RX ORDER — WATER 10 ML/10ML
INJECTION INTRAMUSCULAR; INTRAVENOUS; SUBCUTANEOUS
Status: DISCONTINUED
Start: 2023-11-10 | End: 2023-11-10 | Stop reason: HOSPADM

## 2023-11-10 RX ORDER — DROPERIDOL 2.5 MG/ML
0.62 INJECTION, SOLUTION INTRAMUSCULAR; INTRAVENOUS
Status: DISCONTINUED | OUTPATIENT
Start: 2023-11-10 | End: 2023-11-10 | Stop reason: HOSPADM

## 2023-11-10 RX ORDER — IBUPROFEN 600 MG/1
600 TABLET ORAL 3 TIMES DAILY PRN
Qty: 25 TABLET | Refills: 0 | Status: SHIPPED | OUTPATIENT
Start: 2023-11-10

## 2023-11-10 RX ORDER — POLYETHYLENE GLYCOL 3350 17 G/17G
17 POWDER, FOR SOLUTION ORAL DAILY
Qty: 116 G | Refills: 0 | COMMUNITY
Start: 2023-11-10 | End: 2023-11-17

## 2023-11-10 RX ORDER — ONDANSETRON 2 MG/ML
INJECTION INTRAMUSCULAR; INTRAVENOUS PRN
Status: DISCONTINUED | OUTPATIENT
Start: 2023-11-10 | End: 2023-11-10 | Stop reason: SDUPTHER

## 2023-11-10 RX ORDER — FENTANYL CITRATE 50 UG/ML
25 INJECTION, SOLUTION INTRAMUSCULAR; INTRAVENOUS EVERY 5 MIN PRN
Status: DISCONTINUED | OUTPATIENT
Start: 2023-11-10 | End: 2023-11-10 | Stop reason: HOSPADM

## 2023-11-10 RX ORDER — ACETAMINOPHEN 500 MG
1000 TABLET ORAL
Status: DISCONTINUED | OUTPATIENT
Start: 2023-11-10 | End: 2023-11-10 | Stop reason: HOSPADM

## 2023-11-10 RX ORDER — MEPERIDINE HYDROCHLORIDE 25 MG/ML
12.5 INJECTION INTRAMUSCULAR; INTRAVENOUS; SUBCUTANEOUS EVERY 5 MIN PRN
Status: DISCONTINUED | OUTPATIENT
Start: 2023-11-10 | End: 2023-11-10 | Stop reason: HOSPADM

## 2023-11-10 RX ORDER — LIDOCAINE HYDROCHLORIDE AND EPINEPHRINE BITARTRATE 20; .01 MG/ML; MG/ML
INJECTION, SOLUTION SUBCUTANEOUS PRN
Status: DISCONTINUED | OUTPATIENT
Start: 2023-11-10 | End: 2023-11-10 | Stop reason: ALTCHOICE

## 2023-11-10 RX ORDER — OXYCODONE HYDROCHLORIDE 5 MG/1
10 TABLET ORAL PRN
Status: DISCONTINUED | OUTPATIENT
Start: 2023-11-10 | End: 2023-11-10 | Stop reason: HOSPADM

## 2023-11-10 RX ORDER — SODIUM CHLORIDE, SODIUM LACTATE, POTASSIUM CHLORIDE, CALCIUM CHLORIDE 600; 310; 30; 20 MG/100ML; MG/100ML; MG/100ML; MG/100ML
INJECTION, SOLUTION INTRAVENOUS CONTINUOUS
Status: DISCONTINUED | OUTPATIENT
Start: 2023-11-10 | End: 2023-11-10 | Stop reason: HOSPADM

## 2023-11-10 RX ORDER — CEFAZOLIN SODIUM 1 G/3ML
INJECTION, POWDER, FOR SOLUTION INTRAMUSCULAR; INTRAVENOUS
Status: DISCONTINUED
Start: 2023-11-10 | End: 2023-11-10 | Stop reason: HOSPADM

## 2023-11-10 RX ORDER — LIDOCAINE HYDROCHLORIDE 20 MG/ML
INJECTION, SOLUTION EPIDURAL; INFILTRATION; INTRACAUDAL; PERINEURAL PRN
Status: DISCONTINUED | OUTPATIENT
Start: 2023-11-10 | End: 2023-11-10 | Stop reason: SDUPTHER

## 2023-11-10 RX ORDER — SODIUM CHLORIDE 0.9 % (FLUSH) 0.9 %
5-40 SYRINGE (ML) INJECTION EVERY 12 HOURS SCHEDULED
Status: DISCONTINUED | OUTPATIENT
Start: 2023-11-10 | End: 2023-11-10 | Stop reason: HOSPADM

## 2023-11-10 RX ORDER — OXYCODONE HYDROCHLORIDE 5 MG/1
5 TABLET ORAL EVERY 8 HOURS PRN
Qty: 15 TABLET | Refills: 0 | Status: SHIPPED | OUTPATIENT
Start: 2023-11-10 | End: 2023-11-17

## 2023-11-10 RX ORDER — FENTANYL CITRATE 50 UG/ML
INJECTION, SOLUTION INTRAMUSCULAR; INTRAVENOUS PRN
Status: DISCONTINUED | OUTPATIENT
Start: 2023-11-10 | End: 2023-11-10 | Stop reason: SDUPTHER

## 2023-11-10 RX ORDER — MIDAZOLAM HYDROCHLORIDE 1 MG/ML
2 INJECTION, SOLUTION INTRAMUSCULAR; INTRAVENOUS
Status: DISCONTINUED | OUTPATIENT
Start: 2023-11-10 | End: 2023-11-10 | Stop reason: HOSPADM

## 2023-11-10 RX ORDER — DEXMEDETOMIDINE HYDROCHLORIDE 100 UG/ML
INJECTION, SOLUTION INTRAVENOUS PRN
Status: DISCONTINUED | OUTPATIENT
Start: 2023-11-10 | End: 2023-11-10 | Stop reason: SDUPTHER

## 2023-11-10 RX ADMIN — PROPOFOL 100 MCG/KG/MIN: 10 INJECTION, EMULSION INTRAVENOUS at 07:37

## 2023-11-10 RX ADMIN — LIDOCAINE HYDROCHLORIDE 40 MG: 20 INJECTION, SOLUTION EPIDURAL; INFILTRATION; INTRACAUDAL; PERINEURAL at 07:35

## 2023-11-10 RX ADMIN — Medication 80 MCG: at 08:06

## 2023-11-10 RX ADMIN — FENTANYL CITRATE 50 MCG: 50 INJECTION, SOLUTION INTRAMUSCULAR; INTRAVENOUS at 07:37

## 2023-11-10 RX ADMIN — DEXMEDETOMIDINE HYDROCHLORIDE 10 MCG: 100 INJECTION, SOLUTION, CONCENTRATE INTRAVENOUS at 07:41

## 2023-11-10 RX ADMIN — FENTANYL CITRATE 50 MCG: 50 INJECTION, SOLUTION INTRAMUSCULAR; INTRAVENOUS at 07:49

## 2023-11-10 RX ADMIN — PROPOFOL 50 MG: 10 INJECTION, EMULSION INTRAVENOUS at 07:37

## 2023-11-10 RX ADMIN — ONDANSETRON 4 MG: 2 INJECTION INTRAMUSCULAR; INTRAVENOUS at 07:42

## 2023-11-10 RX ADMIN — WATER 2000 MG: 1 INJECTION INTRAMUSCULAR; INTRAVENOUS; SUBCUTANEOUS at 07:39

## 2023-11-10 RX ADMIN — SODIUM CHLORIDE, POTASSIUM CHLORIDE, SODIUM LACTATE AND CALCIUM CHLORIDE: 600; 310; 30; 20 INJECTION, SOLUTION INTRAVENOUS at 07:02

## 2023-11-10 RX ADMIN — Medication 80 MCG: at 07:55

## 2023-11-10 RX ADMIN — MIDAZOLAM HYDROCHLORIDE 2 MG: 1 INJECTION, SOLUTION INTRAMUSCULAR; INTRAVENOUS at 07:28

## 2023-11-10 RX ADMIN — PROPOFOL 50 MG: 10 INJECTION, EMULSION INTRAVENOUS at 07:39

## 2023-11-10 RX ADMIN — DEXAMETHASONE SODIUM PHOSPHATE 10 MG: 4 INJECTION, SOLUTION INTRAMUSCULAR; INTRAVENOUS at 07:42

## 2023-11-10 RX ADMIN — Medication 80 MCG: at 08:02

## 2023-11-10 ASSESSMENT — PAIN - FUNCTIONAL ASSESSMENT: PAIN_FUNCTIONAL_ASSESSMENT: 0-10

## 2023-11-10 NOTE — ANESTHESIA POSTPROCEDURE EVALUATION
Department of Anesthesiology  Postprocedure Note    Patient: Josette Montana  MRN: [id number]  YOB: 1980  Date of evaluation: 11/10/2023      Procedure Summary     Date: 11/10/23 Room / Location: [facility] ASU B3 / [facility] AMBULATORY OR    Anesthesia Start: 2154 Anesthesia Stop: 8545    Procedure: EXCISION SOFT TISSUE MASS MID BACK (Back) Diagnosis:       Soft tissue mass      (Soft tissue mass [M79.89])    Surgeons: Melissa Colindres MD Responsible Provider: Erica Dillard MD    Anesthesia Type: MAC ASA Status: 3          Anesthesia Type: MAC    Eloina Phase I: Eloina Score: 10    Eloina Phase II: Eloina Score: 10      Anesthesia Post Evaluation    Patient location during evaluation: PACU  Patient participation: complete - patient participated  Level of consciousness: awake and alert  Pain score: 0  Airway patency: patent  Nausea & Vomiting: no nausea and no vomiting  Complications: no  Cardiovascular status: hemodynamically stable  Respiratory status: acceptable  Hydration status: euvolemic  Multimodal analgesia pain management approach  Pain management: satisfactory to patient

## 2023-11-10 NOTE — PERIOP NOTE
Jerrye Boas  1980  [id number]    Situation:  Verbal report given from: ELIA Trujillo CRNA  Procedure: Procedure(s):  EXCISION SOFT TISSUE MASS MID BACK    Background:    Preoperative diagnosis: Soft tissue mass [M79.89]    Postoperative diagnosis: * No post-op diagnosis entered *    :  Dr. Young Martell    Assistant(s): Circulator: Ben Tineo RN  Scrub Person First: Piter Cho; Debbie Huerta RN    Specimens:   ID Type Source Tests Collected by Time Destination   1 : mid right back mass,  short stitch superior, long stitch lateral Tissue Back SURGICAL PATHOLOGY Traci Clarke MD 11/10/2023 0296        Assessment:  Intra-procedure medications         Anesthesia gave intra-procedure sedation and medications, see anesthesia flow sheet     Intravenous fluids: LR@ KVO     Vital signs stable       Recommendation:    Permission to share finding with family

## 2023-11-10 NOTE — INTERVAL H&P NOTE
Update History & Physical    The Patient's History and Physical attached was reviewed with the patient and I examined the patient. There is no change. The surgical site was confirmed by the patient and me. Plan:  The risk, benefits, expected outcome, and alternative to the recommended procedure have been discussed with Joy Zahras. Blood sugars remain elevated. He states he has been taking his meds and has been working closely with endocrinology to get it down. Increased risk of poor wound healing and infection. He understands and wants to proceed with the procedure. Site examined and marked.

## 2023-11-10 NOTE — PERIOP NOTE
0508 Pt arrived to PACU. Pt not arrousable. VSS. Blood sugar 301.      0830 Pt waking up more. Dressing on back CDI. Ice pack to site. 0900 Pt alert and oriented. VSS. Dressing on back CDI. Pt meets discharge criteria.

## 2023-11-10 NOTE — OP NOTE
OPERATIVE NOTE  11/10/2023    Preperative Diagnosis: Soft tissue mass [M79.89]  Post-operative Diagnosis: same  Procedure: Procedure(s):  EXCISION SOFT TISSUE MASS MID BACK 5 cm    Surgeon: Bill Negrete MD    Circulator: Lucio Greene RN  Scrub Person First: Raysa High; Emili Rosenberg RN    Anesthesia: Monitor Anesthesia Care   Estimated Blood Loss: Minimal  Specimens:   ID Type Source Tests Collected by Time Destination   1 : mid right back mass,  short stitch superior, long stitch lateral Tissue Back SURGICAL PATHOLOGY Shayy Teresa MD 11/10/2023 0759       Findings: Fatty but firm with dense inflammatory changes. Complications: None  Implants: None      Myrtle Rashid is a 37 y.o. male who has been brought to the operating room for procedure as above. The risks, benefits, and alternatives were explained and consent was obtained for the procedure. Procedure was confirmed and site marked with the patient. After IV sedation the patient was kept in the prone position. The area was prepped and draped in the usual manner. Surgical assistant was not available for this procedure. Local anesthetic was infiltrated into the skin and soft tissue surrounding the mass. An incision was made. The mass extended through the superficial fascia, and up against the deep fascia but not involving the muscle. There was intense inflammation around the mass. The mass was fatty but firm. It was dissected free of surrounding tissues and excised in its entirety and send for pathology. Only minimal margins were taken as any more would have required more extensive dissection and reconstruction. We will await final pathology with possible need for further excision. Hemostasis was noted. The superficial fascia was closed with interrupted 2-0 Vicryl followed by 3-0 Vicryl followed by closure of the skin with 3-0 barbed Monocryl. The wound was covered with Dermabond.   He remained stable during my presence in the operating

## 2023-11-10 NOTE — PERIOP NOTE
Informed Dr. Patricia Mccallum that pt's blood glucose this morning was 344. He stated that this pt consistently runs high and if Dr. Crocker Parents thinks he can have surgery today he is okay with that.

## 2023-11-10 NOTE — ANESTHESIA PRE PROCEDURE
Type & Screen (If Applicable):  No results found for: \"LABABO\", \"LABRH\"    Drug/Infectious Status (If Applicable):  No results found for: \"HIV\", \"HEPCAB\"    COVID-19 Screening (If Applicable): No results found for: \"COVID19\"        Anesthesia Evaluation  Patient summary reviewed and Nursing notes reviewed  Airway: Mallampati: III  TM distance: >3 FB   Neck ROM: full  Mouth opening: > = 3 FB   Dental:    (+) implants  Comment: x1    Pulmonary:Negative Pulmonary ROS breath sounds clear to auscultation                            ROS comment: Quit 03/23 (1/2 ppd)   Cardiovascular:Negative CV ROS  Exercise tolerance: good (>4 METS),           Rhythm: regular  Rate: normal                    Neuro/Psych:   Negative Neuro/Psych ROS              GI/Hepatic/Renal:   (+) GERD: well controlled,          ROS comment: On Semiglutide for DM. Endo/Other:    (+) DiabetesType II DM, poorly controlled, , .                  ROS comment: Back mass Abdominal:             Vascular: negative vascular ROS. Other Findings:           Anesthesia Plan      MAC     ASA 3       Induction: intravenous. MIPS: Postoperative opioids intended and Prophylactic antiemetics administered. Anesthetic plan and risks discussed with patient. Plan discussed with CRNA.                     Elham Spann MD   11/10/2023

## 2023-11-10 NOTE — PROGRESS NOTES
Permission received to review discharge instructions and discuss private health information with girlfriend, Mindi Friend. Patient states that family/friend will be with them for at least 24 hours following today's procedure. Mistral-Air warming blanket applied at this time. Set to appropriate setting that is comfortable to patient. Will continue to monitor.

## 2023-12-01 ENCOUNTER — TELEPHONE (OUTPATIENT)
Age: 43
End: 2023-12-01

## 2023-12-01 NOTE — TELEPHONE ENCOUNTER
Call out to patient to see how he is doing and to see if we can reschedule his follow up appt s/p his excision of STM. Had to leave a voice mail for him to call back.

## 2023-12-07 ENCOUNTER — TELEPHONE (OUTPATIENT)
Age: 43
End: 2023-12-07

## 2023-12-11 ENCOUNTER — TELEPHONE (OUTPATIENT)
Age: 43
End: 2023-12-11

## 2023-12-11 NOTE — TELEPHONE ENCOUNTER
Called to see if we could reschedule his office visit that was missed on 12/7/23.  Had to leave a voice mail

## 2023-12-15 NOTE — TELEPHONE ENCOUNTER
I have tried to call to reschedule patient appt and ask how his incision was doing but I am unable to reach patient.  It goes straight to voice mail

## (undated) DEVICE — SUTURE VCRL SZ 2-0 L27IN ABSRB UD L26MM SH 1/2 CIR J417H

## (undated) DEVICE — SYRINGE MED 10ML LUERLOCK TIP W/O SFTY DISP

## (undated) DEVICE — SOLIDIFIER MEDC 1200ML -- CONVERT TO 356117

## (undated) DEVICE — CATH IV AUTOGRD BC PNK 20GA 25 -- INSYTE

## (undated) DEVICE — LIQUIBAND RAPID ADHESIVE 36/CS 0.8ML: Brand: MEDLINE

## (undated) DEVICE — GOWN,SIRUS,NONRNF,SETINSLV,XL,20/CS: Brand: MEDLINE

## (undated) DEVICE — CUFF RMFG BP INF SZ 11 DISP -- LAWSON OEM ITEM 238915

## (undated) DEVICE — 4-PORT MANIFOLD: Brand: NEPTUNE 2

## (undated) DEVICE — CONTAINER SPEC 20 ML LID NEUT BUFF FORMALIN 10 % POLYPR STS

## (undated) DEVICE — BAG BELONG PT PERS CLEAR HANDL

## (undated) DEVICE — SOLUTION IRRIG 1000ML 0.9% SOD CHL USP POUR PLAS BTL

## (undated) DEVICE — 1200 GUARD II KIT W/5MM TUBE W/O VAC TUBE: Brand: GUARDIAN

## (undated) DEVICE — ELECTRODE PT RET AD L9FT HI MOIST COND ADH HYDRGEL CORDED

## (undated) DEVICE — SUTURE ABSORBABLE BRAIDED 3-0 SHB 18 IN UD VICRYL + VCPB864D

## (undated) DEVICE — BAG SPEC BIOHZRD 10 X 10 IN --

## (undated) DEVICE — Device

## (undated) DEVICE — SET IV EXT 3WAY STOPCOCK 20IN --

## (undated) DEVICE — KIT COLON DUAL END BRSH W/LUBE -- CUSTOM BX/20 FORMERLY KS-18-20

## (undated) DEVICE — FCPS RAD JAW 4LC 240CM W/NDL -- BX/40

## (undated) DEVICE — 3M™ CUROS™ DISINFECTING CAP FOR NEEDLELESS CONNECTORS 270/CARTON 20 CARTONS/CASE CFF1-270: Brand: CUROS™

## (undated) DEVICE — SUTURE STRATAFIX SPRL MCRYL + SZ 3-0 L12IN ABSRB UD PS-2 SXMP1B106

## (undated) DEVICE — APPLICATOR MEDICATED 26 CC SOLUTION HI LT ORNG CHLORAPREP

## (undated) DEVICE — HYPODERMIC SAFETY NEEDLE: Brand: MAGELLAN

## (undated) DEVICE — CANN NASAL O2 CAPNOGRAPHY AD -- FILTERLINE

## (undated) DEVICE — GLOVE SURG SZ 7.5 L11.2IN THK9.8MIL STRW LTX POLYMER BEAD

## (undated) DEVICE — SUTURE MCRYL SZ 4-0 L27IN ABSRB UD L19MM PS-2 1/2 CIR PRIM Y426H

## (undated) DEVICE — SHEET, T, LAPAROTOMY, STERILE: Brand: MEDLINE

## (undated) DEVICE — NEONATAL-ADULT SPO2 SENSOR: Brand: NELLCOR

## (undated) DEVICE — SET IV ADMIN GRAVITY W/STPCOCK --

## (undated) DEVICE — ELECTRODE,RADIOTRANSLUCENT,FOAM,3PK: Brand: MEDLINE

## (undated) DEVICE — BLOCK BITE ENDOSCP AD 21 MM W/ DIL BLU LF DISP